# Patient Record
Sex: MALE | Race: OTHER
[De-identification: names, ages, dates, MRNs, and addresses within clinical notes are randomized per-mention and may not be internally consistent; named-entity substitution may affect disease eponyms.]

---

## 2021-12-04 ENCOUNTER — HOSPITAL ENCOUNTER (EMERGENCY)
Dept: HOSPITAL 56 - MW.ED | Age: 51
Discharge: HOME | End: 2021-12-04
Payer: COMMERCIAL

## 2021-12-04 DIAGNOSIS — Z20.822: ICD-10-CM

## 2021-12-04 DIAGNOSIS — R07.89: Primary | ICD-10-CM

## 2021-12-04 LAB
BUN SERPL-MCNC: 19 MG/DL (ref 7–18)
CHLORIDE SERPL-SCNC: 104 MMOL/L (ref 98–107)
CO2 SERPL-SCNC: 25.2 MMOL/L (ref 21–32)
FLUAV RNA UPPER RESP QL NAA+PROBE: NEGATIVE
FLUBV RNA UPPER RESP QL NAA+PROBE: NEGATIVE
GLUCOSE SERPL-MCNC: 115 MG/DL (ref 74–106)
POTASSIUM SERPL-SCNC: 4.2 MMOL/L (ref 3.5–5.1)
SARS-COV-2 RNA RESP QL NAA+PROBE: NEGATIVE
SODIUM SERPL-SCNC: 140 MMOL/L (ref 136–148)

## 2021-12-04 PROCEDURE — 93005 ELECTROCARDIOGRAM TRACING: CPT

## 2021-12-04 PROCEDURE — 85025 COMPLETE CBC W/AUTO DIFF WBC: CPT

## 2021-12-04 PROCEDURE — 99285 EMERGENCY DEPT VISIT HI MDM: CPT

## 2021-12-04 PROCEDURE — 84484 ASSAY OF TROPONIN QUANT: CPT

## 2021-12-04 PROCEDURE — 0240U: CPT

## 2021-12-04 PROCEDURE — 36415 COLL VENOUS BLD VENIPUNCTURE: CPT

## 2021-12-04 PROCEDURE — 71045 X-RAY EXAM CHEST 1 VIEW: CPT

## 2021-12-04 PROCEDURE — 80053 COMPREHEN METABOLIC PANEL: CPT

## 2021-12-04 NOTE — CR
INDICATION:



 Chest pain 



TECHNIQUE:



Single view chest.



FINDINGS:



The lungs are clear. The heart, mediastinum and pulmonary vessels are of 

normal size. There is no evidence of pleural disease.



IMPRESSION:



Negative chest.



Dictated by Autumn Guerra MD @ 12/4/2021 12:02:36 PM



(Electronically Signed)

## 2021-12-04 NOTE — EDM.PDOC
ED HPI GENERAL MEDICAL PROBLEM





- General


Chief Complaint: Chest Pain


Stated Complaint: CHEST PAIN


Time Seen by Provider: 12/04/21 10:30


Source of Information: Reports: Patient


History Limitations: Reports: No Limitations





- History of Present Illness


INITIAL COMMENTS - FREE TEXT/NARRATIVE: 


HISTORY AND PHYSICAL:





History of present illness:


Patient is an otherwise healthy 51-year-old male who presents emergency room 

today with concern of nonexertional chest pain that has been ongoing for the 

past 2 days.  Patient states that he is also having pain of his left shoulder 

which tends to be more associated if he turns his head to the left.  Patient 

states that he has also been coughing and has had a stuffy runny nose over the 

past 3 days.  Patient states that he has not taken anything for his symptoms.  

He does state that the sensation in his chest is like a "tickle" sensation and a

pressure.  Patient states that the pain has been coming and going and has not 

been constant and states that he is not currently having the chest pain at this 

moment. 





Patient denies fever, chills, shortness of breath, or cough. Denies headache, 

neck stiff ness, change in vision, syncope, or near syncope. Denies nausea, 

vomiting, abdominal pain, diarrhea, constipation, or dysuria. Has not noted any 

blood in urine or stool. Patient has been eating and drinking appropriately.





Review of systems: 


As per history of present illness and below otherwise all systems reviewed and 

negative.





Past medical history: 


As per history of present illness and as reviewed below otherwise noncontributo

ry.





Surgical history: 


As per history of present illness and as reviewed below otherwise 

noncontributory.





Social history: 


See social history for further information





Family history: 


As per history of present illness and as reviewed below otherwise 

noncontributory.





Physical exam:


General: Patient is alert, oriented, and in no acute distress. Patient sitting 

comfortably on exam table. Vitals stable and reviewed by me.


HEENT: Atraumatic, normocephalic, pupils equal and reactive bilaterally, 

negative for conjunctival pallor or scleral icterus, mucous membranes moist, 

throat clear, neck supple, nontender, trachea midline. No drooling or trismus 

noted. No meningeal signs. No hot potato voice noted. 


Lungs: Clear to auscultation, breath sounds equal bilaterally, chest nontender.


Heart: S1S2, regular rate and rhythm without overt murmur


Abdomen: Soft, nondistended, nontender. Negative for masses or 

hepatosplenomegaly. Negative for costovertebral tenderness.


Pelvis: Stable nontender.


Genitourinary: Deferred.


Rectal: Deferred.


Skin: Intact, warm, dry. No lesions or rashes noted.


Extremities: Atraumatic, negative for cords or calf pain. Neurovascular 

unremarkable.


Neuro: Awake, alert, oriented. Cranial nerves II through XII unremarkable. 

Cerebellum unremarkable. Motor and sensory unremarkable throughout. Exam 

nonfocal.





Medical Decision Making:


Patient is an otherwise healthy 51 year old male who presents to the emergency 

room today with concern of chest pain x2 days.  Patient is also having 

associated cough, and left shoulder pain which exacerbated with turning his head

to the left.  Upon arrival to the ED, patient is vitally stable and well-

appearing on exam.  He states that he is not currently having chest pain this 

exact moment.  Will obtain cardiac evaluation, provided with aspirin, and 

reassess patient.





See Dr. Gilbert dictation for specific EKG interpretation. Otherwise, NSR without

STEMI


Mild derangements CBC and CMP are unremarkable.  Initial troponin negative.  

Influenza and Covid negative.  Chest x-ray is unremarkable.


Repeat troponin remains negative.





Heart score 3-low risk.





On reevaluation of patient, he remains vitally stable and does not have any recu

rrence of his chest pain today in the emergency room.  I did discuss the 

importance for close follow-up with a primary care provider and encouraged 

patient to call Monday morning to set up a close follow-up appointment.  Strict 

return precautions thoroughly discussed with patient.


Voices understanding and is agreeable to plan of care. Denies any further 

questions or concerns at this time.





Diagnostics:


EKG, CBC, CMP, troponin, chest x-ray, COVID-19/influenza, delta trop





Therapeutics:


Aspirin, saline lock





Prescription:


None





Impression: 


Atypical chest pain





Plan:


1.  You can alternate ibuprofen and Tylenol as directed for pain and discomfort.


2.  Follow-up with a primary care provider as discussed.  Return to the ED as 

needed and as discussed.


 





Definitive disposition and diagnosis as appropriate pending reevaluation and 

review of above.





  ** Middle Chest


Pain Score (Numeric/FACES): 2





- Related Data


                                    Allergies











Allergy/AdvReac Type Severity Reaction Status Date / Time


 


No Known Allergies Allergy   Verified 12/04/21 10:35











Home Meds: 


                                    Home Meds





. [No Known Home Meds]  12/04/21 [History]











ED ROS GENERAL





- Review of Systems


Review Of Systems: Comprehensive ROS is negative, except as noted in HPI.





ED EXAM, GENERAL





- Physical Exam


Exam: See Below (see dictation)





Course





- Vital Signs


Last Recorded V/S: 


                                Last Vital Signs











Temp  98.0 F   12/04/21 10:36


 


Pulse  91   12/04/21 10:36


 


Resp  16   12/04/21 10:36


 


BP  140/85   12/04/21 10:36


 


Pulse Ox  94 L  12/04/21 10:36














- Orders/Labs/Meds


Orders: 


                               Active Orders 24 hr











 Category Date Time Status


 


 Cardiac Monitoring [RC] .AS DIRECTED Care  12/04/21 10:34 Active


 


 Sodium Chloride 0.9% [Saline Flush] Med  12/04/21 10:34 Active





 10 ml FLUSH ASDIRECTED PRN   


 


 Sodium Chloride 0.9% [Saline Flush] Med  12/04/21 10:34 Active





 2.5 ml FLUSH ASDIRECTED PRN   


 


 Saline Lock Insert [OM.PC] Stat Oth  12/04/21 10:34 Ordered








                                Medication Orders





Sodium Chloride (Sodium Chloride 0.9% 10 Ml Syringe)  10 ml FLUSH ASDIRECTED PRN


   PRN Reason: Keep Vein Open


   Last Admin: 12/04/21 10:57  Dose: 10 ml


   Documented by: STIVEN


Sodium Chloride (Sodium Chloride 0.9% 2.5 Ml Syringe)  2.5 ml FLUSH ASDIRECTED 

PRN


   PRN Reason: Keep Vein Open


   Last Admin: 12/04/21 10:57  Dose: 2.5 ml


   Documented by: STIVEN








Labs: 


                                Laboratory Tests











  12/04/21 12/04/21 12/04/21 Range/Units





  10:36 10:36 10:48 


 


WBC  10.32    (4.0-11.0)  K/uL


 


RBC  5.56    (4.50-5.90)  M/uL


 


Hgb  18.5 H    (13.0-17.0)  g/dL


 


Hct  51.9 H    (38.0-50.0)  %


 


MCV  93.3    (80.0-98.0)  fL


 


MCH  33.3 H    (27.0-32.0)  pg


 


MCHC  35.6    (31.0-37.0)  g/dL


 


RDW Std Deviation  47.8    (28.0-62.0)  fl


 


RDW Coeff of Sheng  14    (11.0-15.0)  %


 


Plt Count  178    (150-400)  K/uL


 


MPV  10.30    (7.40-12.00)  fL


 


Neut % (Auto)  73.8    (48.0-80.0)  %


 


Lymph % (Auto)  12.6 L    (16.0-40.0)  %


 


Mono % (Auto)  11.6    (0.0-15.0)  %


 


Eos % (Auto)  1.8    (0.0-7.0)  %


 


Baso % (Auto)  0.2    (0.0-1.5)  %


 


Neut # (Auto)  7.6 H    (1.4-5.7)  K/uL


 


Lymph # (Auto)  1.3    (0.6-2.4)  K/uL


 


Mono # (Auto)  1.2 H    (0.0-0.8)  K/uL


 


Eos # (Auto)  0.2    (0.0-0.7)  K/uL


 


Baso # (Auto)  0.0    (0.0-0.1)  K/uL


 


Nucleated RBC %  0.0    /100WBC


 


Nucleated RBCs #  0    K/uL


 


Sodium   140   (136-148)  mmol/L


 


Potassium   4.2   (3.5-5.1)  mmol/L


 


Chloride   104   ()  mmol/L


 


Carbon Dioxide   25.2   (21.0-32.0)  mmol/L


 


BUN   19 H   (7.0-18.0)  mg/dL


 


Creatinine   1.2   (0.8-1.3)  mg/dL


 


Est Cr Clr Drug Dosing   63.35   mL/min


 


Estimated GFR (MDRD)   > 60.0   ml/min


 


Glucose   115 H   ()  mg/dL


 


Calcium   9.2   (8.5-10.1)  mg/dL


 


Total Bilirubin   0.5   (0.2-1.0)  mg/dL


 


AST   31   (15-37)  IU/L


 


ALT   51   (14-63)  IU/L


 


Alkaline Phosphatase   107   ()  U/L


 


Troponin I   < 0.050   (0.000-0.056)  ng/mL


 


Total Protein   8.6 H   (6.4-8.2)  g/dL


 


Albumin   3.4   (3.4-5.0)  g/dL


 


Globulin   5.2 H   (2.6-4.0)  g/dL


 


Albumin/Globulin Ratio   0.7 L   (0.9-1.6)  


 


Influenza Type A RNA    NEGATIVE  (NEGATIVE)  


 


Influenza Type B RNA    NEGATIVE  (NEGATIVE)  


 


SARS-CoV-2 RNA ([facility])    NEGATIVE  (NEGATIVE)  














  12/04/21 Range/Units





  12:34 


 


WBC   (4.0-11.0)  K/uL


 


RBC   (4.50-5.90)  M/uL


 


Hgb   (13.0-17.0)  g/dL


 


Hct   (38.0-50.0)  %


 


MCV   (80.0-98.0)  fL


 


MCH   (27.0-32.0)  pg


 


MCHC   (31.0-37.0)  g/dL


 


RDW Std Deviation   (28.0-62.0)  fl


 


RDW Coeff of Sheng   (11.0-15.0)  %


 


Plt Count   (150-400)  K/uL


 


MPV   (7.40-12.00)  fL


 


Neut % (Auto)   (48.0-80.0)  %


 


Lymph % (Auto)   (16.0-40.0)  %


 


Mono % (Auto)   (0.0-15.0)  %


 


Eos % (Auto)   (0.0-7.0)  %


 


Baso % (Auto)   (0.0-1.5)  %


 


Neut # (Auto)   (1.4-5.7)  K/uL


 


Lymph # (Auto)   (0.6-2.4)  K/uL


 


Mono # (Auto)   (0.0-0.8)  K/uL


 


Eos # (Auto)   (0.0-0.7)  K/uL


 


Baso # (Auto)   (0.0-0.1)  K/uL


 


Nucleated RBC %   /100WBC


 


Nucleated RBCs #   K/uL


 


Sodium   (136-148)  mmol/L


 


Potassium   (3.5-5.1)  mmol/L


 


Chloride   ()  mmol/L


 


Carbon Dioxide   (21.0-32.0)  mmol/L


 


BUN   (7.0-18.0)  mg/dL


 


Creatinine   (0.8-1.3)  mg/dL


 


Est Cr Clr Drug Dosing   mL/min


 


Estimated GFR (MDRD)   ml/min


 


Glucose   ()  mg/dL


 


Calcium   (8.5-10.1)  mg/dL


 


Total Bilirubin   (0.2-1.0)  mg/dL


 


AST   (15-37)  IU/L


 


ALT   (14-63)  IU/L


 


Alkaline Phosphatase   ()  U/L


 


Troponin I  < 0.050  (0.000-0.056)  ng/mL


 


Total Protein   (6.4-8.2)  g/dL


 


Albumin   (3.4-5.0)  g/dL


 


Globulin   (2.6-4.0)  g/dL


 


Albumin/Globulin Ratio   (0.9-1.6)  


 


Influenza Type A RNA   (NEGATIVE)  


 


Influenza Type B RNA   (NEGATIVE)  


 


SARS-CoV-2 RNA ([facility])   (NEGATIVE)  











Meds: 


Medications











Generic Name Dose Route Start Last Admin





  Trade Name Freq  PRN Reason Stop Dose Admin


 


Sodium Chloride  10 ml  12/04/21 10:34  12/04/21 10:57





  Sodium Chloride 0.9% 10 Ml Syringe  FLUSH   10 ml





  ASDIRECTED PRN   Administration





  Keep Vein Open  


 


Sodium Chloride  2.5 ml  12/04/21 10:34  12/04/21 10:57





  Sodium Chloride 0.9% 2.5 Ml Syringe  FLUSH   2.5 ml





  ASDIRECTED PRN   Administration





  Keep Vein Open  














Discontinued Medications














Generic Name Dose Route Start Last Admin





  Trade Name Freq  PRN Reason Stop Dose Admin


 


Aspirin  324 mg  12/04/21 10:44  12/04/21 10:56





  Aspirin 81 Mg Tab.Chew  PO  12/04/21 10:45  324 mg





  ONETIME ONE   Administration














Departure





- Departure


Time of Disposition: 13:05


Disposition: Home, Self-Care 01


Clinical Impression: 


 Atypical chest pain








- Discharge Information


Referrals: 


PCP,None [Primary Care Provider] - 


Forms:  ED Department Discharge


Additional Instructions: 


The following information is given to patients seen in the emergency department 

who are being discharged to home. This information is to outline your options 

for follow-up care. We provide all patients seen in our emergency department 

with a follow-up referral.





The need for follow-up, as well as the timing and circumstances, are variable 

depending upon the specifics of your emergency department visit.





If you don't have a primary care physician on staff, we will provide you with a 

referral. We always advise you to contact your personal physician following an 

emergency department visit to inform them of the circumstance of the visit and 

for follow-up with them and/or the need for any referrals to a consulting 

specialist.





The emergency department will also refer you to a specialist when appropriate. 

This referral assures that you have the opportunity for follow-up care with a 

specialist. All of these measure are taken in an effort to provide you with 

optimal care, which includes your follow-up.





Under all circumstances we always encourage you to contact your private 

physician who remains a resource for coordinating your care. When calling for 

follow-up care, please make the office aware that this follow-up is from your 

recent emergency room visit. If for any reason you are refused follow-up, please

contact the [facility] Emergency Department

at [phone] and asked to speak to the emergency department charge nurse.





[facility]


Primary Care


[address] [place] [address]


Phone: [phone]


Fax: [phone]





[facility]


[address] [place] [id number]


Phone: [phone]


Fax: [phone]








1.  You can alternate ibuprofen and Tylenol as directed for pain and discomfort.


2.  Follow-up with a primary care provider as discussed.  Return to the ED as 

needed and as discussed.


 











Sepsis Event Note (ED)





- Evaluation


Sepsis Screening Result: No Definite Risk





- Focused Exam


Vital Signs: 


                                   Vital Signs











  Temp Pulse Resp BP Pulse Ox


 


 12/04/21 10:36  98.0 F  91  16  140/85  94 L














- My Orders


Last 24 Hours: 


My Active Orders





12/04/21 10:34


Cardiac Monitoring [RC] .AS DIRECTED 


Sodium Chloride 0.9% [Saline Flush]   10 ml FLUSH ASDIRECTED PRN 


Sodium Chloride 0.9% [Saline Flush]   2.5 ml FLUSH ASDIRECTED PRN 


Saline Lock Insert [OM.PC] Stat 














- Assessment/Plan


Last 24 Hours: 


My Active Orders





12/04/21 10:34


Cardiac Monitoring [RC] .AS DIRECTED 


Sodium Chloride 0.9% [Saline Flush]   10 ml FLUSH ASDIRECTED PRN 


Sodium Chloride 0.9% [Saline Flush]   2.5 ml FLUSH ASDIRECTED PRN 


Saline Lock Insert [OM.PC] Stat

## 2021-12-04 NOTE — PCM.EKG
** #1 Interpretation


EKG Date: 12/04/21


Time: 10:25


EKG Interpretation Comments: 





EKG:


As interpreted by ER physician: Ofelia:


Nonspecific ST-T wave abnormalities


Normal axis


No evidence of ST elevation MI


Normal sinus rhythm heart rate of 84

## 2023-07-29 ENCOUNTER — HOSPITAL ENCOUNTER (EMERGENCY)
Dept: HOSPITAL 56 - MW.ED | Age: 53
Discharge: HOME | End: 2023-07-29
Payer: SELF-PAY

## 2023-07-29 DIAGNOSIS — S60.351A: Primary | ICD-10-CM

## 2023-07-29 DIAGNOSIS — Z23: ICD-10-CM

## 2023-07-29 DIAGNOSIS — Z86.16: ICD-10-CM
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
